# Patient Record
(demographics unavailable — no encounter records)

---

## 2024-11-13 NOTE — DEVELOPMENTAL MILESTONES
[Normal Development] : Normal Development [Scoops well with spoon] : scoops well with spoon [Uses 50 words] : uses 50 words [Combine 2 words into phrase or] : combines 2 words into phrase or sentences [Follows 2-step command] : follows 2-step command [Kicks ball] : kicks ball  [Stacks objects] : stacks objects [Uses hands to turn objects] : uses hands to turn objects

## 2024-11-14 NOTE — PHYSICAL EXAM
[Alert] : alert [No Acute Distress] : no acute distress [Normocephalic] : normocephalic [Anterior [place] Closed] : anterior fontanelle closed [Red Reflex Bilateral] : red reflex bilateral [PERRL] : PERRL [Normally Placed Ears] : normally placed ears [Auricles Well Formed] : auricles well formed [Clear Tympanic membranes with present light reflex and bony landmarks] : clear tympanic membranes with present light reflex and bony landmarks [No Discharge] : no discharge [Nares Patent] : nares patent [Palate Intact] : palate intact [Uvula Midline] : uvula midline [Tooth Eruption] : tooth eruption  [Supple, full passive range of motion] : supple, full passive range of motion [No Palpable Masses] : no palpable masses [Symmetric Chest Rise] : symmetric chest rise [Clear to Auscultation Bilaterally] : clear to auscultation bilaterally [Regular Rate and Rhythm] : regular rate and rhythm [S1, S2 present] : S1, S2 present [No Murmurs] : no murmurs [+2 Femoral Pulses] : +2 femoral pulses [Soft] : soft [NonTender] : non tender [Non Distended] : non distended [Normoactive Bowel Sounds] : normoactive bowel sounds [No Hepatomegaly] : no hepatomegaly [No Splenomegaly] : no splenomegaly [Central Urethral Opening] : central urethral opening [Testicles Descended Bilaterally] : testicles descended bilaterally [Patent] : patent [Normally Placed] : normally placed [No Abnormal Lymph Nodes Palpated] : no abnormal lymph nodes palpated [No Clavicular Crepitus] : no clavicular crepitus [Symmetric Buttocks Creases] : symmetric buttocks creases [No Spinal Dimple] : no spinal dimple [NoTuft of Hair] : no tuft of hair [Cranial Nerves Grossly Intact] : cranial nerves grossly intact [No Rash or Lesions] : no rash or lesions [de-identified] : upper molars erupting

## 2024-11-14 NOTE — HISTORY OF PRESENT ILLNESS
[In nursery school] : In nursery school [Up to date] : Up to date [NO] : No [Father] : father [Table food] : table food [Dairy] : dairy [Normal] : Normal [In crib] : In crib [Pacifier use] : Pacifier use [Sippy cup use] : Sippy cup use [Yes] : Patient goes to dentist yearly [Vitamin] : Primary Fluoride Source: Vitamin [Playtime 60 min a day] : Playtime 60 min a day [FreeTextEntry3] : wakes once at night [FreeTextEntry9] : Nursery school

## 2024-11-14 NOTE — PHYSICAL EXAM
[Alert] : alert [No Acute Distress] : no acute distress [Normocephalic] : normocephalic [Anterior [place] Closed] : anterior fontanelle closed [Red Reflex Bilateral] : red reflex bilateral [PERRL] : PERRL [Normally Placed Ears] : normally placed ears [Auricles Well Formed] : auricles well formed [Clear Tympanic membranes with present light reflex and bony landmarks] : clear tympanic membranes with present light reflex and bony landmarks [No Discharge] : no discharge [Nares Patent] : nares patent [Palate Intact] : palate intact [Uvula Midline] : uvula midline [Tooth Eruption] : tooth eruption  [Supple, full passive range of motion] : supple, full passive range of motion [No Palpable Masses] : no palpable masses [Symmetric Chest Rise] : symmetric chest rise [Clear to Auscultation Bilaterally] : clear to auscultation bilaterally [Regular Rate and Rhythm] : regular rate and rhythm [S1, S2 present] : S1, S2 present [No Murmurs] : no murmurs [+2 Femoral Pulses] : +2 femoral pulses [Soft] : soft [NonTender] : non tender [Non Distended] : non distended [Normoactive Bowel Sounds] : normoactive bowel sounds [No Hepatomegaly] : no hepatomegaly [No Splenomegaly] : no splenomegaly [Central Urethral Opening] : central urethral opening [Testicles Descended Bilaterally] : testicles descended bilaterally [Patent] : patent [Normally Placed] : normally placed [No Abnormal Lymph Nodes Palpated] : no abnormal lymph nodes palpated [No Clavicular Crepitus] : no clavicular crepitus [Symmetric Buttocks Creases] : symmetric buttocks creases [No Spinal Dimple] : no spinal dimple [NoTuft of Hair] : no tuft of hair [Cranial Nerves Grossly Intact] : cranial nerves grossly intact [No Rash or Lesions] : no rash or lesions [de-identified] : upper molars erupting

## 2024-11-14 NOTE — DISCUSSION/SUMMARY
[Normal Growth] : growth [Normal Development] : development [None] : No known medical problems [No Elimination Concerns] : elimination [No Feeding Concerns] : feeding [No Skin Concerns] : skin [Normal Sleep Pattern] : sleep [Assessment of Language Development] : assessment of language development [Temperament and Behavior] : temperament and behavior [Safety] : safety [FreeTextEntry1] : Hep A  and flu to be discussed w mom [FreeTextEntry2] : unable to obtain go Check

## 2024-12-23 NOTE — DISCUSSION/SUMMARY
[FreeTextEntry1] : F/u throat culture and treat accordingly Supportive care: cold drinks for throat pain, Motrin/Tylenol for pain or fever, increase hydration, elevate head of bed, humidified air, nasal saline for congestion Appropriate anticipatory guidance and education given; seek care if symptoms persist or worsen. D/w parent seek care if unable to tolerate PO or decreased UOP, persistent fever.

## 2024-12-23 NOTE — PHYSICAL EXAM
[Clear Rhinorrhea] : clear rhinorrhea [Erythematous Oropharynx] : erythematous oropharynx [NL] : warm, clear [Wheezing] : no wheezing [Tachypnea] : no tachypnea [Rhonchi] : no rhonchi [Belly Breathing] : no belly breathing [FreeTextEntry1] : crying

## 2024-12-23 NOTE — HISTORY OF PRESENT ILLNESS
[FreeTextEntry6] : 1 y/o 2 M BIB by parents for cough since last night. Noted to have several days of congestion. No fever.  Denies respiratory distress, no stridor or wheeze noted. Mother noted chest congestion last night. + Rhinorrhea and nasal congestion. Tolerating PO, normal BMs, good UOP. Attends .

## 2025-01-07 NOTE — HISTORY OF PRESENT ILLNESS
[de-identified] : fever, cough [FreeTextEntry6] : BIB mother for fever to 102, wet sounding cough x5 days.  Patient recently returned form family vacation to [place] where he was exposed to family with similar symptoms.  Mother reports patient had started takin clofroctl suppository (abx) while in [place], diagnosed with URI. Luggage was lost along with abx, patient on medication total of 4 days. No difficulty breathing. No v/d. No rash. No fatigue. Good po/uop/bm. Normal sleep and activity.

## 2025-01-07 NOTE — DISCUSSION/SUMMARY
[FreeTextEntry1] : Anticipatory guidance and parent education given.  Symptoms likely due to viral URI.  May use Tylenol or Ibuprofen as needed for fever or discomfort. Recommend supportive care including increased fluids, rest, and nasal saline followed by nasal suction.  F/u in 2 days with persisting fever  Return if symptoms worsen or persist.

## 2025-02-26 NOTE — DISCUSSION/SUMMARY
[FreeTextEntry1] : Anticipatory guidance and parent education given.  Rapid flu and COVID negative Symptoms likely due to viral URI.  May use Tylenol or Ibuprofen as needed for fever or discomfort. Recommend supportive care including increased fluids, rest, and nasal saline followed by nasal suction.  Return if symptoms worsen or persist.

## 2025-02-26 NOTE — HISTORY OF PRESENT ILLNESS
[de-identified] : fever, cough [FreeTextEntry6] : BIB mother for fever to 103, decreased appetite, wet cough x3 days.  Around family members with similar symptoms this past weekend. No difficulty breathing. No v/d. No rash. No fatigue. Good po/uop/bm. Normal sleep and activity.

## 2025-05-08 NOTE — DEVELOPMENTAL MILESTONES
[Urinates in a potty or toilet] : urinates in a potty or toilet [Names at least one color] : names at least one color [Runs well without falling] : runs well without falling [Grasps crayon with thumb] : grasps crayon with thumb and fingers instead of fist [Normal Development] : Normal Development [Yes] : Completed. [FreeTextEntry1] : passed

## 2025-05-08 NOTE — DISCUSSION/SUMMARY
[Normal Growth] : growth [Normal Development] : development [Language Promotion and Communication] : language promotion and communication [Social Development] : social development [ Considerations] :  considerations [Safety] : safety [FreeTextEntry1] : Hep A   RV  age 3 [] : The components of the vaccine(s) to be administered today are listed in the plan of care. The disease(s) for which the vaccine(s) are intended to prevent and the risks have been discussed with the caretaker.  The risks are also included in the appropriate vaccination information statements which have been provided to the patient's caregiver.  The caregiver has given consent to vaccinate.

## 2025-05-08 NOTE — PHYSICAL EXAM

## 2025-05-08 NOTE — HISTORY OF PRESENT ILLNESS
[Mother] : mother [Dairy] : dairy [Normal] : Normal [Sippy cup use] : Sippy cup use [Yes] : Patient goes to dentist yearly [In nursery school] : In nursery school [Brushing teeth] : Brushing teeth [No] : No cigarette smoke exposure [Up to date] : Up to date [de-identified] : healthy eater [FreeTextEntry8] : starting TT [NO] : No